# Patient Record
Sex: MALE | Race: WHITE | NOT HISPANIC OR LATINO | Employment: FULL TIME | URBAN - METROPOLITAN AREA
[De-identification: names, ages, dates, MRNs, and addresses within clinical notes are randomized per-mention and may not be internally consistent; named-entity substitution may affect disease eponyms.]

---

## 2023-09-18 ENCOUNTER — OFFICE VISIT (OUTPATIENT)
Dept: CARDIOLOGY | Facility: CLINIC | Age: 23
End: 2023-09-18
Payer: COMMERCIAL

## 2023-09-18 VITALS
SYSTOLIC BLOOD PRESSURE: 120 MMHG | HEIGHT: 75 IN | DIASTOLIC BLOOD PRESSURE: 90 MMHG | BODY MASS INDEX: 17.53 KG/M2 | HEART RATE: 70 BPM | WEIGHT: 141 LBS

## 2023-09-18 DIAGNOSIS — R55 VASOVAGAL EPISODE: Primary | ICD-10-CM

## 2023-09-18 PROCEDURE — 93000 ELECTROCARDIOGRAM COMPLETE: CPT | Performed by: INTERNAL MEDICINE

## 2023-09-18 PROCEDURE — 99204 OFFICE O/P NEW MOD 45 MIN: CPT | Performed by: INTERNAL MEDICINE

## 2023-09-18 RX ORDER — ACETAMINOPHEN,DIPHENHYDRAMINE HCL 500; 25 MG/1; MG/1
1 TABLET, FILM COATED ORAL NIGHTLY PRN
COMMUNITY

## 2023-09-18 NOTE — PROGRESS NOTES
Subjective:     Encounter Date:09/18/2023      Patient ID: Madi Mcwilliams is a 22 y.o. male.    Chief Complaint: vasovagal syncope  HPI:   This is a 22-year-old man who presents for evaluation.  He has a history of vasovagal syncope.  He has passed out several times when having blood drawn.  In December 2022 he was in a car looking at lights with his friends.  He got very nauseous.  When he got out of the car and stood up he passed out.  He was seen at [place] emergency room had a head CT which was normal.  All his lab tests were normal.  EKG I personally reviewed today and is normal.  He is accompanied by his mother today. He feels well with no complaints. He his active in his job at [employer]. He has no angina dyspnea dizziness palpitations. Sometimes he hears his pulse in his ears or throbbing in his head, however this is infrequent and brief.     The following portions of the patient's history were reviewed and updated as appropriate: allergies, current medications, past family history, past medical history, past social history, past surgical history and problem list.     REVIEW OF SYSTEMS:   All systems reviewed.  Pertinent positives identified in HPI.  All other systems are negative.    Past Medical History:   Diagnosis Date    Tachycardia        Family History   Problem Relation Age of Onset    Stroke Maternal Grandmother     Breast cancer Maternal Grandmother     Aneurysm Maternal Grandmother     Heart disease Maternal Grandfather     Heart attack Maternal Grandfather        Social History     Socioeconomic History    Marital status: Single   Tobacco Use    Smoking status: Never     Passive exposure: Past    Smokeless tobacco: Never   Vaping Use    Vaping Use: Never used   Substance and Sexual Activity    Alcohol use: Not Currently    Drug use: Never    Sexual activity: Yes     Partners: Female       Allergies   Allergen Reactions    Latex Hives       Past Surgical History:   Procedure Laterality Date     TONSILLECTOMY      WISDOM TOOTH EXTRACTION           ECG 12 Lead    Date/Time: 9/18/2023 10:51 AM  Performed by: Barbara Jones MD  Authorized by: Barbara Jones MD   Comparison: compared with previous ECG from 12/19/2022  Similar to previous ECG  Rhythm: sinus rhythm  Rate: normal  Conduction: conduction normal  ST Segments: ST segments normal  T Waves: T waves normal  QRS axis: normal  Other: no other findings    Clinical impression: normal ECG           Objective:         PHYSICAL EXAM:  GEN: VSS, no distress,   Eyes: normal sclera, normal lids and lashes  HENT: moist mucus membranes,   Respiratory: CTAB, no rales or wheezes  CV: RRR, no murmurs, , +2 DP and 2+ carotid pulses b/l  GI: NABS, soft,  Nontender, nondistended  MSK: no edema, no scoliosis or kyphosis  Skin: no rash, warm, dry  Heme/Lymph: no bruising or bleeding  Psych: organized thought, normal behavior and affect  Neuro: Cranial nerves grossly intact, Alert and Oriented x 3.         Assessment:          Diagnosis Plan   1. Vasovagal episode               Plan:       Vasovagal syncope: He may return to work.If he feels dizzy or nauseous he should lay down. If he is in a situation in which he knows he often passes out (blood draw), he should lay down.     Dr. Massey, thank you very much for referring this kind patient to me. Please call me with any questions or concerns. I will see the patient again in the office as needed.          Barbara Jones MD  09/18/23  [facility]    Outpatient Encounter Medications as of 9/18/2023   Medication Sig Dispense Refill    diphenhydrAMINE-acetaminophen (TYLENOL PM)  MG tablet per tablet Take 1 tablet by mouth At Night As Needed for Sleep.       No facility-administered encounter medications on file as of 9/18/2023.

## 2023-09-18 NOTE — LETTER
September 18, 2023     Patient: Madi Mcwilliams   YOB: 2000   Date of Visit: 9/18/2023       To Whom It May Concern:    It is my medical opinion that Madi Mcwilliams may return to full duty immediately with no restrictions.           Sincerely,        Barbara Jones MD    CC:   No Recipients

## 2025-03-27 ENCOUNTER — OFFICE (OUTPATIENT)
Dept: URBAN - METROPOLITAN AREA CLINIC 5 | Facility: CLINIC | Age: 25
End: 2025-03-27

## 2025-03-27 VITALS
WEIGHT: 150 LBS | HEIGHT: 75 IN | HEART RATE: 66 BPM | SYSTOLIC BLOOD PRESSURE: 125 MMHG | DIASTOLIC BLOOD PRESSURE: 83 MMHG

## 2025-03-27 DIAGNOSIS — K92.1 MELENA: ICD-10-CM

## 2025-03-27 DIAGNOSIS — R19.4 CHANGE IN BOWEL HABIT: ICD-10-CM

## 2025-03-27 PROCEDURE — 99204 OFFICE O/P NEW MOD 45 MIN: CPT | Performed by: INTERNAL MEDICINE

## 2025-04-17 ENCOUNTER — ON CAMPUS - OUTPATIENT (OUTPATIENT)
Dept: URBAN - METROPOLITAN AREA HOSPITAL 108 | Facility: HOSPITAL | Age: 25
End: 2025-04-17
Payer: COMMERCIAL

## 2025-04-17 DIAGNOSIS — R19.4 CHANGE IN BOWEL HABIT: ICD-10-CM

## 2025-04-17 DIAGNOSIS — K29.50 UNSPECIFIED CHRONIC GASTRITIS WITHOUT BLEEDING: ICD-10-CM

## 2025-04-17 DIAGNOSIS — K31.89 OTHER DISEASES OF STOMACH AND DUODENUM: ICD-10-CM

## 2025-04-17 PROCEDURE — 45380 COLONOSCOPY AND BIOPSY: CPT | Performed by: INTERNAL MEDICINE

## 2025-04-17 PROCEDURE — 43239 EGD BIOPSY SINGLE/MULTIPLE: CPT | Mod: 51 | Performed by: INTERNAL MEDICINE
